# Patient Record
Sex: MALE | Race: BLACK OR AFRICAN AMERICAN | Employment: OTHER | URBAN - METROPOLITAN AREA
[De-identification: names, ages, dates, MRNs, and addresses within clinical notes are randomized per-mention and may not be internally consistent; named-entity substitution may affect disease eponyms.]

---

## 2017-04-08 ENCOUNTER — OCC HEALTH (OUTPATIENT)
Dept: OCCUPATIONAL MEDICINE | Age: 52
End: 2017-04-08
Attending: PHYSICIAN ASSISTANT

## 2018-01-25 ENCOUNTER — HOSPITAL ENCOUNTER (OUTPATIENT)
Age: 53
Discharge: HOME | End: 2018-01-25
Attending: FAMILY MEDICINE
Payer: COMMERCIAL

## 2018-01-25 VITALS
OXYGEN SATURATION: 95 % | TEMPERATURE: 98 F | RESPIRATION RATE: 20 BRPM | WEIGHT: 295 LBS | DIASTOLIC BLOOD PRESSURE: 104 MMHG | HEART RATE: 78 BPM | SYSTOLIC BLOOD PRESSURE: 161 MMHG | HEIGHT: 70 IN | BODY MASS INDEX: 42.23 KG/M2

## 2018-01-25 DIAGNOSIS — I10 ESSENTIAL HYPERTENSION: Primary | ICD-10-CM

## 2018-01-25 LAB
#LYMPHOCYTE IC: 2.2 X10ˆ3/UL (ref 0.9–3.2)
#MXD IC: 0.7 X10ˆ3/UL (ref 0.1–1)
#NEUTROPHIL IC: 4.9 X10ˆ3/UL (ref 1.3–6.7)
CREAT SERPL-MCNC: 0.8 MG/DL (ref 0.7–1.3)
GLUCOSE BLD-MCNC: 101 MG/DL (ref 70–99)
HCT IC: 50.7 % (ref 37–54)
HGB IC: 17.2 G/DL (ref 13–17)
ISTAT BLOOD GAS TCO2: 28 MMOL/L (ref 22–32)
ISTAT BUN: 8 MG/DL (ref 8–20)
ISTAT CHLORIDE: 104 MMOL/L (ref 101–111)
ISTAT HEMATOCRIT: 51 % (ref 37–53)
ISTAT IONIZED CALCIUM: 1.17 MMOL/L (ref 1.12–1.32)
ISTAT POTASSIUM: 4 MMOL/L (ref 3.6–5.1)
ISTAT SODIUM: 141 MMOL/L (ref 136–144)
LYMPHOCYTES NFR BLD AUTO: 28.7 %
MCH IC: 29.6 PG (ref 27–33.2)
MCHC IC: 33.9 G/DL (ref 31–37)
MCV IC: 87.3 FL (ref 80–99)
MIXED CELL %: 8.5 %
NEUTROPHILS NFR BLD AUTO: 62.8 %
PLT IC: 240 X10ˆ3/UL (ref 150–450)
POCT BILIRUBIN URINE: NEGATIVE
POCT GLUCOSE URINE: NEGATIVE MG/DL
POCT KETONE URINE: NEGATIVE MG/DL
POCT LEUKOCYTE ESTERASE URINE: NEGATIVE
POCT NITRITE URINE: NEGATIVE
POCT PH URINE: 5.5 (ref 5–8)
POCT PROTEIN URINE: NEGATIVE MG/DL
POCT SPECIFIC GRAVITY URINE: 1.02
POCT URINE COLOR: YELLOW
POCT UROBILINOGEN URINE: 0.2 MG/DL
RBC IC: 5.81 X10ˆ6/UL (ref 4.3–5.7)
WBC IC: 7.8 X10ˆ3/UL (ref 4–13)

## 2018-01-25 PROCEDURE — 93005 ELECTROCARDIOGRAM TRACING: CPT

## 2018-01-25 PROCEDURE — 93010 ELECTROCARDIOGRAM REPORT: CPT

## 2018-01-25 PROCEDURE — 80047 BASIC METABLC PNL IONIZED CA: CPT

## 2018-01-25 PROCEDURE — 85025 COMPLETE CBC W/AUTO DIFF WBC: CPT | Performed by: FAMILY MEDICINE

## 2018-01-25 PROCEDURE — 99214 OFFICE O/P EST MOD 30 MIN: CPT

## 2018-01-25 PROCEDURE — 36415 COLL VENOUS BLD VENIPUNCTURE: CPT

## 2018-01-25 PROCEDURE — 99204 OFFICE O/P NEW MOD 45 MIN: CPT

## 2018-01-25 PROCEDURE — 81002 URINALYSIS NONAUTO W/O SCOPE: CPT | Performed by: FAMILY MEDICINE

## 2018-01-25 RX ORDER — HYDROCHLOROTHIAZIDE 12.5 MG/1
12.5 TABLET ORAL DAILY
Qty: 30 TABLET | Refills: 0 | Status: SHIPPED | OUTPATIENT
Start: 2018-01-25 | End: 2018-02-24

## 2018-01-26 LAB
ATRIAL RATE: 72 BPM
P AXIS: 47 DEGREES
P-R INTERVAL: 154 MS
Q-T INTERVAL: 388 MS
QRS DURATION: 108 MS
QTC CALCULATION (BEZET): 424 MS
R AXIS: -37 DEGREES
T AXIS: 22 DEGREES
VENTRICULAR RATE: 72 BPM

## 2018-01-26 NOTE — ED PROVIDER NOTES
Patient Seen in: [facility] [place]    History   Patient presents with:  Blood Pressure: elevated    Stated Complaint: ELEVATED BLOOD PRESSURE     HPI    46year old male presents for high blood pressure.  States he was told he has borde Conjunctivae and EOM are normal. Pupils are equal, round, and reactive to light. Neck: Normal range of motion. Neck supple. Cardiovascular: Normal rate, regular rhythm, normal heart sounds and intact distal pulses.     Pulmonary/Chest: Effort normal and R-0

## 2018-01-26 NOTE — ED INITIAL ASSESSMENT (HPI)
Pt. Reports elevated blood pressure Tuesday during a physical exam. States he does not have a doctor. Denies any headaches or urinary issues.

## 2018-01-26 NOTE — ED NOTES
Phlebotomy attempt x2, 1st Rt. Antecubital, no blood return. 2nd attempt Rt. Hand with 23g butterfly, blood return, small & slow filling in purple & mint green tube, vein possibly blew. Gauze & band-aid applied.

## 2023-02-26 ENCOUNTER — HOSPITAL ENCOUNTER (OUTPATIENT)
Age: 58
Discharge: HOME | End: 2023-02-26
Attending: EMERGENCY MEDICINE
Payer: COMMERCIAL

## 2023-02-26 VITALS
HEIGHT: 69 IN | SYSTOLIC BLOOD PRESSURE: 202 MMHG | DIASTOLIC BLOOD PRESSURE: 118 MMHG | WEIGHT: 310 LBS | BODY MASS INDEX: 45.91 KG/M2 | HEART RATE: 80 BPM | RESPIRATION RATE: 20 BRPM | OXYGEN SATURATION: 97 % | TEMPERATURE: 98 F

## 2023-02-26 DIAGNOSIS — I10 PRIMARY HYPERTENSION: Primary | ICD-10-CM

## 2023-02-26 LAB
BUN BLD-MCNC: 8 MG/DL (ref 7–18)
CHLORIDE BLD-SCNC: 105 MMOL/L (ref 98–112)
CO2 BLD-SCNC: 27 MMOL/L (ref 21–32)
CREAT BLD-MCNC: 1 MG/DL
GFR SERPLBLD BASED ON 1.73 SQ M-ARVRAT: 88 ML/MIN/1.73M2 (ref 60–?)
GLUCOSE BLD-MCNC: 128 MG/DL (ref 70–99)
HCT VFR BLD CALC: 48 %
ISTAT IONIZED CALCIUM FOR CHEM 8: 1.19 MMOL/L (ref 1.12–1.32)
POTASSIUM BLD-SCNC: 3.5 MMOL/L (ref 3.6–5.1)
SODIUM BLD-SCNC: 143 MMOL/L (ref 136–145)

## 2023-02-26 PROCEDURE — 99203 OFFICE O/P NEW LOW 30 MIN: CPT

## 2023-02-26 PROCEDURE — 99204 OFFICE O/P NEW MOD 45 MIN: CPT

## 2023-02-26 PROCEDURE — 36415 COLL VENOUS BLD VENIPUNCTURE: CPT

## 2023-02-26 PROCEDURE — 80047 BASIC METABLC PNL IONIZED CA: CPT

## 2023-02-26 RX ORDER — AMLODIPINE AND OLMESARTAN MEDOXOMIL 5; 20 MG/1; MG/1
1 TABLET ORAL DAILY
Qty: 30 TABLET | Refills: 0 | Status: SHIPPED | OUTPATIENT
Start: 2023-02-26 | End: 2023-03-28

## 2023-02-26 NOTE — ED INITIAL ASSESSMENT (HPI)
C/o elevated blood pressure couple of weeks ago 149/98. Here today to have it evaluated and have it check.

## 2023-02-26 NOTE — DISCHARGE INSTRUCTIONS
Return for new or worsening symptoms such as chest pain, shortness of breath, weakness. Your blood sugar was mildly elevated at 128 however this is not a fasting sample. Follow-up with a primary care physician in the next 2 weeks.